# Patient Record
Sex: MALE | Race: WHITE
[De-identification: names, ages, dates, MRNs, and addresses within clinical notes are randomized per-mention and may not be internally consistent; named-entity substitution may affect disease eponyms.]

---

## 2017-10-07 ENCOUNTER — HEALTH MAINTENANCE LETTER (OUTPATIENT)
Age: 22
End: 2017-10-07

## 2017-10-28 ENCOUNTER — HEALTH MAINTENANCE LETTER (OUTPATIENT)
Age: 22
End: 2017-10-28

## 2019-05-07 ENCOUNTER — NURSE TRIAGE (OUTPATIENT)
Dept: NURSING | Facility: CLINIC | Age: 24
End: 2019-05-07

## 2019-05-07 ENCOUNTER — TELEPHONE (OUTPATIENT)
Dept: FAMILY MEDICINE | Facility: CLINIC | Age: 24
End: 2019-05-07

## 2019-05-07 NOTE — TELEPHONE ENCOUNTER
Agree with RN response. ER or urgent care would be appropriate.   Not sure why it is routed to me as I have never seen him before and it seems like he is not a patient of our clinic either.

## 2019-05-07 NOTE — TELEPHONE ENCOUNTER
"Mom Anusha calling about her son Miles. He is currently in [place] right now. There is consent to communicate form in pt's chart. Mom asking what her son should do based off symptoms he is having. Advised the following from the MD note in chart:    \"Ruiz Lilly MD\"      5/7/19 3:33 PM   Note      Agree with RN response. ER or urgent care would be appropriate.   Not sure why it is routed to me as I have never seen him before and it seems like he is not a patient of our clinic either.           Caller verbalized understanding and had no further questions.     Sola Sampson, RN/[facility]      Reason for Disposition    Health Information question, no triage required and triager able to answer question    Protocols used: INFORMATION ONLY CALL-A-AH      "

## 2019-05-07 NOTE — TELEPHONE ENCOUNTER
Reason for call:  Patient reporting a symptom    Symptom or request: nausea    Duration (how long have symptoms been present): 6 hours    Have you been treated for this before? No    Additional comments: new pt never been to see providers here at  OR     Phone Number patient can be reached at:  Other phone number:  [phone]    Best Time:  asap    Can we leave a detailed message on this number:  Not Applicable    Call taken on 5/7/2019 at 3:10 PM by Sonia Castellon

## 2019-05-07 NOTE — TELEPHONE ENCOUNTER
Patient reports he woke in the middle of the night last night feeling dizzy  Had 1 drink and 1 slider last night before bed  This morning woke and is very dizzy   Feels like things are moving behind his eyes.  Has only managed to keep down about 3 sips today  Has been vomiting or sleeping most of the day.  Any movement of his head causes extreme nausea, vomiting and dizziness.  Patient was advised should be seen.  He is currently in [place] so will go to ER or Urgent Care in the area.  Last voided more than 6 hours ago and not really feeling need to void.  May need fluid replacement and/or anti nausea meds.  Natalie Kathleen RN

## 2019-05-08 ENCOUNTER — HOSPITAL ENCOUNTER (EMERGENCY)
Dept: HOSPITAL 54 - ER | Age: 24
Discharge: HOME | End: 2019-05-08
Payer: COMMERCIAL

## 2019-05-08 VITALS — HEIGHT: 73 IN | BODY MASS INDEX: 26.77 KG/M2 | WEIGHT: 202 LBS

## 2019-05-08 VITALS — DIASTOLIC BLOOD PRESSURE: 75 MMHG | SYSTOLIC BLOOD PRESSURE: 132 MMHG

## 2019-05-08 DIAGNOSIS — R42: Primary | ICD-10-CM

## 2019-05-08 PROCEDURE — 96361 HYDRATE IV INFUSION ADD-ON: CPT

## 2019-05-08 PROCEDURE — 99283 EMERGENCY DEPT VISIT LOW MDM: CPT

## 2019-05-08 PROCEDURE — 96374 THER/PROPH/DIAG INJ IV PUSH: CPT

## 2019-05-08 PROCEDURE — 96375 TX/PRO/DX INJ NEW DRUG ADDON: CPT
